# Patient Record
Sex: FEMALE | Race: BLACK OR AFRICAN AMERICAN | NOT HISPANIC OR LATINO | Employment: UNEMPLOYED | URBAN - METROPOLITAN AREA
[De-identification: names, ages, dates, MRNs, and addresses within clinical notes are randomized per-mention and may not be internally consistent; named-entity substitution may affect disease eponyms.]

---

## 2017-09-21 PROBLEM — S82.209B OPEN FRACTURE OF TIBIA AND FIBULA, SHAFT: Status: ACTIVE | Noted: 2017-09-21

## 2017-09-21 PROBLEM — S82.409B OPEN FRACTURE OF TIBIA AND FIBULA, SHAFT: Status: ACTIVE | Noted: 2017-09-21

## 2017-09-21 PROBLEM — L03.90 CELLULITIS: Status: ACTIVE | Noted: 2017-01-01

## 2023-01-24 PROBLEM — S82.209B OPEN FRACTURE OF TIBIA AND FIBULA, SHAFT: Status: RESOLVED | Noted: 2017-09-21 | Resolved: 2023-01-24

## 2023-01-24 PROBLEM — S82.409B OPEN FRACTURE OF TIBIA AND FIBULA, SHAFT: Status: RESOLVED | Noted: 2017-09-21 | Resolved: 2023-01-24

## 2023-01-24 PROBLEM — L03.90 CELLULITIS: Status: RESOLVED | Noted: 2017-01-01 | Resolved: 2023-01-24

## 2023-01-26 ENCOUNTER — TELEPHONE (OUTPATIENT)
Dept: PREADMISSION TESTING | Facility: HOSPITAL | Age: 48
End: 2023-01-26
Payer: COMMERCIAL

## 2023-01-26 DIAGNOSIS — Z12.4 SCREENING FOR CERVICAL CANCER: Primary | ICD-10-CM

## 2023-01-26 RX ORDER — POLYETHYLENE GLYCOL 3350, SODIUM SULFATE ANHYDROUS, SODIUM BICARBONATE, SODIUM CHLORIDE, POTASSIUM CHLORIDE 236; 22.74; 6.74; 5.86; 2.97 G/4L; G/4L; G/4L; G/4L; G/4L
4 POWDER, FOR SOLUTION ORAL ONCE
Qty: 4000 ML | Refills: 0 | Status: SHIPPED | OUTPATIENT
Start: 2023-01-26 | End: 2023-01-26

## 2023-01-30 ENCOUNTER — HOSPITAL ENCOUNTER (OUTPATIENT)
Dept: PREADMISSION TESTING | Facility: HOSPITAL | Age: 48
Discharge: HOME | End: 2023-01-30
Attending: INTERNAL MEDICINE
Payer: COMMERCIAL

## 2023-01-30 DIAGNOSIS — Z12.4 SCREENING FOR CERVICAL CANCER: ICD-10-CM

## 2023-02-06 ENCOUNTER — ANESTHESIA EVENT (OUTPATIENT)
Dept: ENDOSCOPY | Facility: HOSPITAL | Age: 48
End: 2023-02-06
Payer: COMMERCIAL

## 2023-02-06 ENCOUNTER — ANESTHESIA (OUTPATIENT)
Dept: ENDOSCOPY | Facility: HOSPITAL | Age: 48
End: 2023-02-06
Payer: COMMERCIAL

## 2023-02-06 ENCOUNTER — HOSPITAL ENCOUNTER (OUTPATIENT)
Facility: HOSPITAL | Age: 48
Discharge: HOME | End: 2023-02-06
Attending: INTERNAL MEDICINE | Admitting: INTERNAL MEDICINE
Payer: COMMERCIAL

## 2023-02-06 DIAGNOSIS — Z12.11 COLON CANCER SCREENING: Primary | ICD-10-CM

## 2023-02-06 LAB
B-HCG UR QL: NEGATIVE
CTP QC/QA: YES

## 2023-02-06 PROCEDURE — 81025 URINE PREGNANCY TEST: CPT | Performed by: INTERNAL MEDICINE

## 2023-02-06 PROCEDURE — G0121 COLON CA SCRN NOT HI RSK IND: HCPCS | Mod: ,,, | Performed by: INTERNAL MEDICINE

## 2023-02-06 PROCEDURE — G0121 COLON CA SCRN NOT HI RSK IND: HCPCS | Performed by: INTERNAL MEDICINE

## 2023-02-06 PROCEDURE — 25000003 PHARM REV CODE 250: Performed by: NURSE ANESTHETIST, CERTIFIED REGISTERED

## 2023-02-06 PROCEDURE — 37000008 HC ANESTHESIA 1ST 15 MINUTES: Performed by: INTERNAL MEDICINE

## 2023-02-06 PROCEDURE — G0121 COLON CA SCRN NOT HI RSK IND: ICD-10-PCS | Mod: ,,, | Performed by: INTERNAL MEDICINE

## 2023-02-06 PROCEDURE — 37000009 HC ANESTHESIA EA ADD 15 MINS: Performed by: INTERNAL MEDICINE

## 2023-02-06 PROCEDURE — 63600175 PHARM REV CODE 636 W HCPCS: Performed by: NURSE ANESTHETIST, CERTIFIED REGISTERED

## 2023-02-06 RX ORDER — LIDOCAINE HYDROCHLORIDE 10 MG/ML
INJECTION, SOLUTION EPIDURAL; INFILTRATION; INTRACAUDAL; PERINEURAL
Status: DISCONTINUED | OUTPATIENT
Start: 2023-02-06 | End: 2023-02-06

## 2023-02-06 RX ORDER — PROPOFOL 10 MG/ML
VIAL (ML) INTRAVENOUS
Status: DISCONTINUED | OUTPATIENT
Start: 2023-02-06 | End: 2023-02-06

## 2023-02-06 RX ORDER — SODIUM CHLORIDE, SODIUM LACTATE, POTASSIUM CHLORIDE, CALCIUM CHLORIDE 600; 310; 30; 20 MG/100ML; MG/100ML; MG/100ML; MG/100ML
INJECTION, SOLUTION INTRAVENOUS CONTINUOUS
Status: DISCONTINUED | OUTPATIENT
Start: 2023-02-06 | End: 2023-02-06 | Stop reason: HOSPADM

## 2023-02-06 RX ORDER — SODIUM CHLORIDE, SODIUM LACTATE, POTASSIUM CHLORIDE, CALCIUM CHLORIDE 600; 310; 30; 20 MG/100ML; MG/100ML; MG/100ML; MG/100ML
INJECTION, SOLUTION INTRAVENOUS CONTINUOUS PRN
Status: DISCONTINUED | OUTPATIENT
Start: 2023-02-06 | End: 2023-02-06

## 2023-02-06 RX ADMIN — PROPOFOL 40 MG: 10 INJECTION, EMULSION INTRAVENOUS at 10:02

## 2023-02-06 RX ADMIN — SODIUM CHLORIDE, SODIUM LACTATE, POTASSIUM CHLORIDE, AND CALCIUM CHLORIDE: .6; .31; .03; .02 INJECTION, SOLUTION INTRAVENOUS at 10:02

## 2023-02-06 RX ADMIN — PROPOFOL 20 MG: 10 INJECTION, EMULSION INTRAVENOUS at 10:02

## 2023-02-06 RX ADMIN — LIDOCAINE HYDROCHLORIDE 50 MG: 10 INJECTION, SOLUTION EPIDURAL; INFILTRATION; INTRACAUDAL; PERINEURAL at 10:02

## 2023-02-06 RX ADMIN — PROPOFOL 100 MG: 10 INJECTION, EMULSION INTRAVENOUS at 10:02

## 2023-02-06 NOTE — ANESTHESIA POSTPROCEDURE EVALUATION
Anesthesia Post Evaluation    Patient: Ana Cristina Fonseca    Procedure(s) Performed: Procedure(s) (LRB):  COLONOSCOPY (N/A)    Final Anesthesia Type: MAC      Patient location during evaluation: GI PACU  Patient participation: Yes- Able to Participate  Level of consciousness: awake and alert  Post-procedure vital signs: reviewed and stable  Pain management: adequate  Airway patency: patent    PONV status at discharge: No PONV  Anesthetic complications: no      Cardiovascular status: blood pressure returned to baseline and hemodynamically stable  Respiratory status: unassisted, spontaneous ventilation and room air  Hydration status: euvolemic  Follow-up not needed.          Vitals Value Taken Time   /86 02/06/23 1055   Temp 36.6 °C (97.9 °F) 02/06/23 1035   Pulse 71 02/06/23 1055   Resp 16 02/06/23 1055   SpO2 98 % 02/06/23 1055         No case tracking events are documented in the log.      Pain/Russell Score: Russell Score: 10 (2/6/2023 10:55 AM)

## 2023-02-06 NOTE — PROVATION PATIENT INSTRUCTIONS
Discharge Summary/Instructions after an Endoscopic Procedure  Patient Name: Ana Cristina Fonseca  Patient MRN: [id number]  Patient YOB: 1975  Monday, February 6, 2023 Chevy Rivera MD  Dear patient,  As a result of recent federal legislation (The Federal Cures Act), you may   receive lab or pathology results from your procedure in your [facility]   account before your physician is able to contact you. Your physician or   their representative will relay the results to you with their   recommendations at their soonest availability.  Thank you,  RESTRICTIONS:  During your procedure today, you received medications for sedation.  These   medications may affect your judgment, balance and coordination.  Therefore,   for 24 hours, you have the following restrictions:   - DO NOT drive a car, operate machinery, make legal/financial decisions,   sign important papers or drink alcohol.    ACTIVITY:  Today: no heavy lifting, straining or running due to procedural   sedation/anesthesia.  The following day: return to full activity including work.  DIET:  Eat and drink normally unless instructed otherwise.     TREATMENT FOR COMMON SIDE EFFECTS:  - Mild abdominal pain, nausea, belching, bloating or excessive gas:  rest,   eat lightly and use a heating pad.  - Sore Throat: treat with throat lozenges and/or gargle with warm salt   water.  - Because air was used during the procedure, expelling large amounts of air   from your rectum or belching is normal.  - If a bowel prep was taken, you may not have a bowel movement for 1-3 days.    This is normal.  SYMPTOMS TO WATCH FOR AND REPORT TO YOUR PHYSICIAN:  1. Abdominal pain or bloating, other than gas cramps.  2. Chest pain.  3. Back pain.  4. Signs of infection such as: chills or fever occurring within 24 hours   after the procedure.  5. Rectal bleeding, which would show as bright red, maroon, or black stools.   (A tablespoon of blood from the rectum is not serious,  especially if   hemorrhoids are present.)  6. Vomiting.  7. Weakness or dizziness.  GO DIRECTLY TO THE NEAREST EMERGENCY ROOM IF YOU HAVE ANY OF THE FOLLOWING:      Difficulty breathing              Chills and/or fever over 101 F   Persistent vomiting and/or vomiting blood   Severe abdominal pain   Severe chest pain   Black, tarry stools   Bleeding- more than one tablespoon   Any other symptom or condition that you feel may need urgent attention  Your doctor recommends these additional instructions:  If any biopsies were taken, your doctors clinic will contact you in 1 to 2   weeks with any results.  - Discharge patient to home.   - Resume previous diet.   - Continue present medications.   - Repeat colonoscopy in 10 years for screening purposes.   - Return to referring physician.   - Patient has a contact number available for emergencies.  The signs and   symptoms of potential delayed complications were discussed with the   patient.  Return to normal activities tomorrow.  Written discharge   instructions were provided to the patient.  For questions, problems or results please call your physician Chevy Rivera MD at Work:  [phone]  If you have any questions about the above instructions, call the GI   department at [phone] or call the endoscopy unit at [phone]   from 7am until 3 pm.  [facility], EMERGENCY ROOM PHONE NUMBER:   [phone]  IF A COMPLICATION OR EMERGENCY SITUATION ARISES AND YOU ARE UNABLE TO REACH   YOUR PHYSICIAN - GO DIRECTLY TO THE EMERGENCY ROOM.  I have read or have had read to me these discharge instructions for my   procedure and have received a written copy.  I understand these   instructions and will follow-up with my physician if I have any questions.     __________________________________       _____________________________________  Nurse Signature                                          Patient/Designated   Responsible Party  Signature  Chevy Rivera MD  2/6/2023 10:36:51 AM  This report has been verified and signed electronically.  Dear patient,  As a result of recent federal legislation (The Federal Cures Act), you may   receive lab or pathology results from your procedure in your [facility]   account before your physician is able to contact you. Your physician or   their representative will relay the results to you with their   recommendations at their soonest availability.  Thank you,  PROVATION

## 2023-02-06 NOTE — TRANSFER OF CARE
"Anesthesia Transfer of Care Note    Patient: Ana Cristina Fonseca    Procedure(s) Performed: Procedure(s) (LRB):  COLONOSCOPY (N/A)    Patient location: GI    Anesthesia Type: MAC    Transport from OR: Transported from OR on room air with adequate spontaneous ventilation    Post pain: adequate analgesia    Post assessment: no apparent anesthetic complications and tolerated procedure well    Post vital signs: stable    Level of consciousness: awake    Nausea/Vomiting: no nausea/vomiting    Complications: none    Transfer of care protocol was followed      Last vitals:   Visit Vitals  /77 (BP Location: Left arm, Patient Position: Lying)   Pulse 75   Temp 36.9 °C (98.4 °F)   Resp 20   Ht 5' 10" (1.778 m)   Wt 99.8 kg (220 lb)   LMP 01/17/2023 (Approximate)   SpO2 99%   Breastfeeding No   BMI 31.57 kg/m²     "

## 2023-02-06 NOTE — ANESTHESIA PREPROCEDURE EVALUATION
02/06/2023  Ana Cristina Fonseca is a 47 y.o., female.      Pre-op Assessment    I have reviewed the Patient Summary Reports.     I have reviewed the Nursing Notes. I have reviewed the NPO Status.   I have reviewed the Medications.     Review of Systems  Anesthesia Hx:  No problems with previous Anesthesia  Denies Family Hx of Anesthesia complications.   Denies Personal Hx of Anesthesia complications.   Social:  Non-Smoker    Hematology/Oncology:  Hematology Normal   Oncology Normal     EENT/Dental:EENT/Dental Normal   Cardiovascular:  Cardiovascular Normal Exercise tolerance: good  ECG has been reviewed.    Pulmonary:  Pulmonary Normal    Renal/:  Renal/ Normal     Hepatic/GI:  Hepatic/GI Normal    Musculoskeletal:  Musculoskeletal Normal    Neurological:  Neurology Normal    Endocrine:  Endocrine Normal    Dermatological:  Skin Normal    Psych:  Psychiatric Normal           Physical Exam  General: Well nourished, Cooperative, Alert and Oriented    Airway:  Mallampati: II   Mouth Opening: Normal  TM Distance: Normal  Tongue: Normal  Neck ROM: Normal ROM    Dental:  Intact  Pt denies loose or removable dentition  Heart:  Rate: Normal  Rhythm: Regular Rhythm        Anesthesia Plan  Type of Anesthesia, risks & benefits discussed:    Anesthesia Type: MAC  Intra-op Monitoring Plan: Standard ASA Monitors  Post Op Pain Control Plan: multimodal analgesia  Induction:  IV  Informed Consent: Informed consent signed with the Patient and all parties understand the risks and agree with anesthesia plan.  All questions answered.   ASA Score: 2  Day of Surgery Review of History & Physical: H&P Update referred to the surgeon/provider.I have interviewed and examined the patient. I have reviewed the patient's H&P dated: There are no significant changes.     Ready For Surgery From Anesthesia Perspective.      .

## 2023-02-06 NOTE — H&P
PRE PROCEDURE H&P    Patient Name: Ana Cristina Fonseca  MRN: [id number]  : 1975  Date of Procedure:  2023  Referring Physician: Shilpa Salcido MD  Primary Physician: Shilpa Salcido MD  Procedure Physician: Chevy Rivera MD       Planned Procedure: Colonoscopy  Diagnosis: screening for colon cancer  Chief Complaint: Same as above    HPI: Patient is an 47 y.o. female is here for the above.     Last colonoscopy: Index colonoscopy   Family history: None   Anticoagulation: None     Past Medical History:   Past Medical History:   Diagnosis Date    Cellulitis 2017    Closed fracture         Past Surgical History:  Past Surgical History:   Procedure Laterality Date    FRACTURE SURGERY      TUBAL LIGATION          Home Medications:  Prior to Admission medications    Medication Sig Start Date End Date Taking? Authorizing Provider   silver sulfADIAZINE 1% (SILVADENE) 1 % cream Apply topically 2 (two) times daily. 23  Yes Shilpa Salcido MD   ciprofloxacin HCl (CIPRO) 500 MG tablet Take 500 mg by mouth every 12 (twelve) hours.  23  Historical Provider   cyclobenzaprine (FLEXERIL) 10 MG tablet Take 10 mg by mouth 3 (three) times daily as needed for Muscle spasms.  23  Historical Provider   ketorolac (TORADOL) 10 mg tablet Take 10 mg by mouth every 6 (six) hours.  23  Historical Provider        Allergies:  Review of patient's allergies indicates:  No Known Allergies     Social History:   Social History     Socioeconomic History    Marital status:     Number of children: 4   Tobacco Use    Smoking status: Never    Smokeless tobacco: Never   Substance and Sexual Activity    Alcohol use: No    Drug use: No    Sexual activity: Yes     Partners: Male       Family History:  Family History   Problem Relation Age of Onset    Diabetes Mother     Hypertension Mother     Heart disease Father        ROS: No acute cardiac events, no acute respiratory complaints.     Physical Exam (all  "patients):    /77 (BP Location: Left arm, Patient Position: Lying)   Pulse 75   Temp 98.4 °F (36.9 °C)   Resp 20   Ht 5' 10" (1.778 m)   Wt 99.8 kg (220 lb)   LMP 01/17/2023 (Approximate)   SpO2 99%   Breastfeeding No   BMI 31.57 kg/m²   Lungs: Clear to auscultation bilaterally, respirations unlabored  Heart: Regular rate and rhythm, S1 and S2 normal, no obvious murmurs  Abdomen:         Soft, non-tender, bowel sounds normal, no masses, no organomegaly    Lab Results   Component Value Date    WBC 5.3 01/24/2023    MCV 91 01/24/2023    RDW 13.5 01/24/2023     (H) 01/24/2023    GLU 97 01/24/2023    HGBA1C 5.3 01/24/2023    BUN 13 01/24/2023     01/24/2023    K 5.0 01/24/2023     01/24/2023        SEDATION PLAN: per anesthesia      History reviewed, vital signs satisfactory, cardiopulmonary status satisfactory, sedation options, risks and plans have been discussed with the patient  All their questions were answered and the patient agrees to the sedation procedures as planned and the patient is deemed an appropriate candidate for the sedation as planned.    Procedure explained to patient, informed consent obtained and placed in chart.    Chevy Rivera  2/6/2023  10:03 AM     "

## 2023-02-06 NOTE — LETTER
January 26, 2023    Ana Cristina Fonseca  [address] [facility] [address]                [address]  [place] [address]  Phone: [phone]  Fax: [phone]             Ana Cristina Fonseca  [address] [place] [place] [address]      [facility] ENDOSCOPY PATIENT INSTRUCTIONS    You are scheduled for a/an colonoscopy (Procedure), on Monday (Day), 2- (Date).   Please check in for your appointment at 5:45 am.                          Your procedure will be performed at [facility] ([facility]). The hospital is located at [address], off [address], [address] ([place] Once on [address], [facility] is the second building on the left. Check in for your procedure on the 1st floor. ([phone])      ALL PATIENTS:   ? Please plan to be at the hospital for 3 - 4 hours.    ? Use of Anesthesia requires you to have a responsible person to drive you to the hospital, stay while the procedure is being performed, assume responsibility for your care at discharge, and drive you home. You should not operate a vehicle, machinery or sign any legal documents until the next day. YOU MUST HAVE A RESPONSIBLE PERSON TO DRIVE YOU HOME.  ? Leave all valuables at home, including jewelry. (Piercings need to be removed) You will need to bring your 's license, medical insurance card, and a method of payment. You will be responsible for any co-payment at time of registration - Please reach out to Financial Services if you have any questions or concerns.    ? If biopsies need to be performed or a polyp needs to be removed, this may result in a change in the billing of your procedure and could impact your payment responsibility depending on your insurance provider.   ? Wear clothing appropriate for easy re-dressing after sedation.   ? Please bring a complete list of all medications you are taking.     MEDICATIONS:  ? BLOOD THINNING MEDICATION (Coumadin, Plavix,  Eliquis, etc.):  o If you are on a blood thinning medication, our scheduling team will obtain clearance to hold from your prescribing physician. Please do not stop these medications until it is approved by your provider. Our scheduling nurse will notify you an appropriate date and time to hold prior to your procedure.  o Coumadin, Plavix and Effient MUST be stopped 5 days prior to exam unless discussed with the doctor performing the test.    o Eliquis, Savaysa, Arixtra, and Xarelto MUST be stopped 2 days prior to exam unless discussed with the doctor performing the test.  ? WEIGHT LOSS MEDICATIONS - MUST be stopped 2 weeks prior to your appointment  ? BLOOD PRESSURE, HEART, SEIZURE, LUNG or PSYCHIATRIC MEDICATIONS you normally take in the morning, please take them the morning of your procedure.  This includes Inhalers.   o Please take these medications one hour prior to your arrival time with a small sip of water    DIABETIC PATIENTS:   ? Check blood sugar levels while following clear liquid diet on the day before the exam. Check again on the morning of the exam and as needed if you feel hypoglycemic (low blood sugar).     ? Do not take any diabetic medications (including insulin) the morning of the exam.    ? If your blood sugar goes below 70, you may drink 4 ounces of clear juice, soda or eat a piece of hard candy. Wait 15 minutes, then recheck your blood sugar. If it isn't going up, you may drink another 4 ounces of clear juice and contact the On-Call Nurse line at [phone].       [facility] GOLYTELY, COLYTE, NULYTELY, GENERIC PEG 3350 W/ELECTROLYTES INSTRUCTIONS  Please use this page as a checklist for your preparation.      [] ITEMS TO PURCHASE BEFORE YOUR PROCEDURE:  Please purchase the following items from your local pharmacy prior to your appointment.    4 Dulcolax (bisacodyl) laxative tablets- no prescription needed, over the counter.   Gas X (simethicone) 125 mg capsules - no prescription needed, over  "the counter.    Dramamine (Meclizine) 25 mg tablet (for nausea)- no prescription needed, over the counter.   Prep- A prescription is required and has been sent to your pharmacy    [] FIVE DAYS BEFORE YOUR PROCEDURE: Begin low fiber diet- see attached instructions.    []THE DAY BEFORE YOUR PROCEDURE :(WHEN YOU WAKE)  Begin clear liquids only - no solid foods may be eaten until after your procedure has been performed/completed.  Please drink 1 - 2 gallons of clear liquids throughout the day.   You may consume the following items:    Coffee, water, or tea. (We agree it's odd, but coffee and tea without milk or creamer is considered a clear liquid)    Clear carbonated beverages (soft drinks), ginger ale, sprite, 7up, sparkling water, etc. No "Energy" beverages.    Gelatin dessert, (JELLO) plain or fruit flavored. No red or purple coloring/No solid pieces of fruit.   Apple juice, white grape juice, or white cranberry juice. No pulp, no orange juice.    Gatorade, Powerade, lemonade, or limeade. No red or purple.    Clear, fat-free, beef or chicken broths, or bouillon.    Snowballs, popsicles, slushes. No red or purple coloring, no pulp.    Avoid any liquids not listed above.     [] 12:00 PM (the day before procedure):   Take 4 Dulcolax (bisacodyl) tablets with a glass of clear liquid   Take 1 Gas X (simethicone) 125 mg capsule with a glass of clear liquid    [] 5:00 PM (the day before procedure), for prevention of nausea and vomiting:   Take ½ of a tablet (12.5 mg) of Meclizine every 6 hours as needed for nausea and/or vomiting. DO NOT TAKE MORE THAN 50 MG IN A 24 HOUR PERIOD.    [] 6:00 PM (the day before procedure) Begin the first portion of the Golytely or Nulytely prep. (Best if refrigerated)  1. Add cool clear liquid to mixing container up to the fill line and mix.  2. Drink HALF of the mixture in the container.  3. You may refrigerate remaining half of the prep until ready to use the next " morning.  Please have this consumed within 1 hour and 30 minutes. ---VERY IMPORTANT---  This entire process is required for the success of the examination.    Clear liquids may be continued until you finish the second portion of the prep. This will help you remain hydrated.    [] 2:00 AM (THE MORNING OF YOUR PROCEDURE): Complete the second portion of your prep.  1. Drink the second HALF of the mixture that was prepared the previous evening.  Please have this consumed within 1 hour and 30 minutes. ---VERY IMPORTANT---  This entire process is required for the success of the examination.      After you complete the bowel prep and the required water/clear liquid, you may not have anything else by mouth except for your medications, with a small sip of water. This includes no gum, mints, tobacco products.    Please follow these instructions to ensure you have a very good prep.  The goal is for stool to be CLEAR OR YELLOW liquid - NOT BROWN.  Avoid having to repeat the procedure due to a poor prep!    Please call [phone] or [phone] if you continue to have brown stool or have any questions about your prep instructions. Remember, due to Anesthesia, you MUST have a responsible person to drive you home!        LOW FIBER DIET  FIVE (5) DAYS BEFORE YOUR PROCEDURE- Please start a Low Fiber Diet as below:  A good preparation (clean out) of the colon is necessary prior to having a colonoscopy in order to ensure that all areas of the colon can be seen without difficulty. It is helpful to avoid high fiber foods prior to having a colonoscopy as high fiber foods (especially seeds and nuts) are more difficult to completely clear out of the colon. We recommend avoiding high fiber foods for at least 4 days prior to your colonoscopy. If you have issues with constipation you may want to start avoiding high fiber foods 7 days before the procedure.    Foods to Include In Your Diet:       Grain Products:    Enriched refined white  bread, buns, bagels, English muffins   Plain cereals e.g., Cheerios, Cornflakes, Cream of Wheat, Rice Krispies, Special K   Tea biscuits, arrowroot cookies, soda crackers, mingo crackers, plain [place] toast and flour tortillas  White rice, refined pasta and noodles   Fruits:   peel fruits when possible  Fruit juices except prune juice   Soft fruits: apricots, banana (1/2), cantaloupe, canned fruit cocktail, grapes,   honeydew melon, peaches, watermelon, citrus fruits, plums, pineapple   Sauces: Apple or apricot        Vegetables:    Vegetable juices    Tomato sauces    Potatoes (no skin)    Well-cooked and tender vegetables including alfalfa sprouts, spinach, beets, carrots, celery, cucumber, eggplant, lettuce, mushrooms, green/red peppers, squash, zucchini, onions, brussel sprouts, asparagus     Meat and Protein Choice:  Well-cooked, tender meat, fish and eggs            Foods to avoid:   ? Whole grain breads and pastas, corn bread or muffins, products made with whole grain products, bran, seeds, or nuts   ? Strong cheeses, yogurt containing fruit skins or seeds   ? Raw vegetables and pickles  ? All types of whole kernel corn   ? All beans, peas, and legumes   ? Fruit peels and hard fruits   ? Tough meat, meat with gristle   ? Crunchy peanut butter   ? Nuts, seeds and popcorn   ? Millet, buckwheat, flax, oatmeal, carmencita seeds   ? Dried fruits, berries, other fruits with pulp or seeds (including blueberries, raspberries, and blackberries)   ? Food containing chocolate, coconut   ? Juices with pulp     Avoiding the recommended foods is one part of helping to ensure that you have adequate preparation and that you do not need to have a repeat colonoscopy any sooner than what would be recommended by the colon cancer screening guidelines.    Please follow these instructions to ensure you have a very good prep.  The goal is for stool to be CLEAR OR YELLOW liquid - NOT BROWN.  Avoid having to repeat the procedure due to a  poor prep!  Please call [phone] or [phone] if you continue to have brown stool or have any questions about your prep instructions.     Questions regarding scheduling: Endoscopy Scheduling [phone] (M-F, 7:30am-4:30pm)   Questions regarding prep or procedure: ([facility] Pre-Op Nurse [phone] or [phone]   Questions regarding prep or procedure: [facility] Pre-Op Nurse: [phone]   Questions about Insurance/ Financial obligations: Financial Services [phone]   [facility] [phone] (M-F, 6:30am-3:00pm)   [facility] [phone] (M-F, 6:30am-3:00pm)            Questions requiring immediate assistance: [facility] On-Call Nurse Line [phone] (After Hours)

## 2023-02-07 VITALS
TEMPERATURE: 98 F | RESPIRATION RATE: 16 BRPM | HEART RATE: 71 BPM | WEIGHT: 220 LBS | HEIGHT: 70 IN | DIASTOLIC BLOOD PRESSURE: 86 MMHG | SYSTOLIC BLOOD PRESSURE: 145 MMHG | OXYGEN SATURATION: 98 % | BODY MASS INDEX: 31.5 KG/M2

## 2023-02-07 PROBLEM — E78.49 OTHER HYPERLIPIDEMIA: Status: ACTIVE | Noted: 2023-02-07

## 2023-02-07 PROBLEM — R73.02 IGT (IMPAIRED GLUCOSE TOLERANCE): Status: ACTIVE | Noted: 2023-02-07

## 2023-02-07 PROBLEM — E88.810 DYSMETABOLIC SYNDROME X: Status: ACTIVE | Noted: 2023-02-07
